# Patient Record
Sex: MALE | Race: BLACK OR AFRICAN AMERICAN | Employment: FULL TIME | URBAN - METROPOLITAN AREA
[De-identification: names, ages, dates, MRNs, and addresses within clinical notes are randomized per-mention and may not be internally consistent; named-entity substitution may affect disease eponyms.]

---

## 2020-08-21 ENCOUNTER — APPOINTMENT (OUTPATIENT)
Dept: GENERAL RADIOLOGY | Facility: CLINIC | Age: 30
End: 2020-08-21
Attending: EMERGENCY MEDICINE

## 2020-08-21 ENCOUNTER — HOSPITAL ENCOUNTER (EMERGENCY)
Facility: CLINIC | Age: 30
Discharge: HOME | End: 2020-08-21
Attending: EMERGENCY MEDICINE | Admitting: EMERGENCY MEDICINE

## 2020-08-21 VITALS
TEMPERATURE: 97.5 F | HEART RATE: 105 BPM | WEIGHT: 160 LBS | DIASTOLIC BLOOD PRESSURE: 80 MMHG | OXYGEN SATURATION: 100 % | RESPIRATION RATE: 16 BRPM | SYSTOLIC BLOOD PRESSURE: 122 MMHG

## 2020-08-21 DIAGNOSIS — S63.636A SPRAIN OF INTERPHALANGEAL JOINT OF RIGHT LITTLE FINGER, INITIAL ENCOUNTER: ICD-10-CM

## 2020-08-21 PROCEDURE — 99284 EMERGENCY DEPT VISIT MOD MDM: CPT | Performed by: EMERGENCY MEDICINE

## 2020-08-21 PROCEDURE — 99283 EMERGENCY DEPT VISIT LOW MDM: CPT | Mod: Z6 | Performed by: EMERGENCY MEDICINE

## 2020-08-21 PROCEDURE — 73130 X-RAY EXAM OF HAND: CPT | Mod: RT

## 2020-08-21 PROCEDURE — 29130 APPL FINGER SPLINT STATIC: CPT | Mod: F9 | Performed by: EMERGENCY MEDICINE

## 2020-08-21 ASSESSMENT — ENCOUNTER SYMPTOMS
WEAKNESS: 0
FEVER: 0
NAUSEA: 0
VOMITING: 0
NUMBNESS: 0

## 2020-08-21 NOTE — ED AVS SNAPSHOT
[facility], [place], Emergency Department  [address]  [facility] [place] [address]  Phone:  [phone]  Fax:  [phone]                                    Ana Lilia Mckeon   MRN: [id number]    Department:  [facility], Emergency Department   Date of Visit:  8/21/2020           After Visit Summary Signature Page    I have received my discharge instructions, and my questions have been answered. I have discussed any challenges I see with this plan with the nurse or doctor.    ..........................................................................................................................................  Patient/Patient Representative Signature      ..........................................................................................................................................  Patient Representative Print Name and Relationship to Patient    ..................................................               ................................................  Date                                   Time    ..........................................................................................................................................  Reviewed by Signature/Title    ...................................................              ..............................................  Date                                               Time          22EPIC Rev 08/18
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
## 2020-08-21 NOTE — DISCHARGE INSTRUCTIONS
Wear splint.  Take ibuprofen as needed for pain.    Follow-up with hand surgery.    Please make an appointment to follow up with [facility] (phone: [phone]) next week.     Return if any concerns.